# Patient Record
Sex: FEMALE | Race: WHITE | NOT HISPANIC OR LATINO | Employment: FULL TIME | URBAN - METROPOLITAN AREA
[De-identification: names, ages, dates, MRNs, and addresses within clinical notes are randomized per-mention and may not be internally consistent; named-entity substitution may affect disease eponyms.]

---

## 2019-10-18 ENCOUNTER — NURSE TRIAGE (OUTPATIENT)
Dept: ADMINISTRATIVE | Facility: CLINIC | Age: 39
End: 2019-10-18

## 2019-10-18 NOTE — TELEPHONE ENCOUNTER
Reason for Disposition   Patient wants to be seen    Additional Information   Negative: Shock suspected (e.g., cold/pale/clammy skin, too weak to stand, low BP, rapid pulse)   Negative: Similar pain previously and it was from 'heart attack'   Negative: Similar pain previously from 'angina' and not relieved by nitroglycerin   Negative: Difficult to awaken or acting confused (e.g., disoriented, slurred speech)   Negative: Sounds like a life-threatening emergency to the triager   Negative: Followed an injury to neck (e.g., MVA, sports, impact or collision)   Negative: Chest pain   Negative: Lymph node in the neck is swollen or painful to the touch   Negative: Sore throat is the main symptom   Negative: Difficulty breathing or unusual sweating (e.g., sweating without exertion)   Negative: Chest pain lasting longer than 5 minutes   Negative: Stiff neck (can't touch chin to chest) and has headache   Negative: Stiff neck (can't touch chin to chest) and fever   Negative: Weakness of an arm or hand   Negative: Problems with bowel or bladder control   Negative: Patient sounds very sick or weak to the triager   Negative: SEVERE pain (e.g., excruciating, unable to do any normal activities)   Negative: Head is twisting to one side (or ask 'is it turning against your will?')   Negative: Fever > 103 F (39.4 C)   Negative: Fever > 100.0 F (37.8 C) and IVDA (intravenous drug abuse)   Negative: Fever > 100.0 F (37.8 C) and has diabetes mellitus or a weak immune system (e.g., HIV positive, cancer chemotherapy, organ transplant, splenectomy, chronic steroids)   Negative: Numbness in an arm or hand (i.e., loss of sensation)   Negative: Painful rash with multiple small blisters grouped together (i.e., dermatomal distribution or 'band' or 'stripe')   Negative: High-risk adult (e.g., history of cancer, HIV, or IV drug abuse)    Protocols used: NECK PAIN OR [id number]  Mom called re Thyroid growing. Told to  find endo as swelling may interfere with breathing. Pt seen yest at [facility] with swelling on neck. Seen at walk in clinic. Had U/S this am. MD told pt to have emerg bx- first available isnt until next tues. Pt is not with caller. Rec local ED. Parent got pt on three way call. Pt states she is feeling dizziness, ear discomfort on L same side x 14 days. Denies resp distress. swallowing ok but slower than usual. Rates pain 3-4. rec OV today. Transferred to clinic for appt.

## 2019-10-21 ENCOUNTER — OFFICE VISIT (OUTPATIENT)
Dept: ENDOCRINOLOGY | Facility: CLINIC | Age: 39
End: 2019-10-21
Payer: COMMERCIAL

## 2019-10-21 ENCOUNTER — TELEPHONE (OUTPATIENT)
Dept: ENDOCRINOLOGY | Facility: CLINIC | Age: 39
End: 2019-10-21

## 2019-10-21 VITALS
SYSTOLIC BLOOD PRESSURE: 108 MMHG | DIASTOLIC BLOOD PRESSURE: 66 MMHG | BODY MASS INDEX: 26.19 KG/M2 | HEIGHT: 65 IN | WEIGHT: 157.19 LBS

## 2019-10-21 DIAGNOSIS — E04.9 ENLARGED THYROID GLAND: Primary | ICD-10-CM

## 2019-10-21 DIAGNOSIS — R63.5 WEIGHT GAIN: ICD-10-CM

## 2019-10-21 DIAGNOSIS — E04.0 NONTOXIC SIMPLE GOITRE: ICD-10-CM

## 2019-10-21 PROCEDURE — 99203 OFFICE O/P NEW LOW 30 MIN: CPT | Mod: S$GLB,,, | Performed by: INTERNAL MEDICINE

## 2019-10-21 PROCEDURE — 3008F PR BODY MASS INDEX (BMI) DOCUMENTED: ICD-10-PCS | Mod: CPTII,S$GLB,, | Performed by: INTERNAL MEDICINE

## 2019-10-21 PROCEDURE — 99999 PR PBB SHADOW E&M-EST. PATIENT-LVL II: ICD-10-PCS | Mod: PBBFAC,,, | Performed by: INTERNAL MEDICINE

## 2019-10-21 PROCEDURE — 3008F BODY MASS INDEX DOCD: CPT | Mod: CPTII,S$GLB,, | Performed by: INTERNAL MEDICINE

## 2019-10-21 PROCEDURE — 99999 PR PBB SHADOW E&M-EST. PATIENT-LVL II: CPT | Mod: PBBFAC,,, | Performed by: INTERNAL MEDICINE

## 2019-10-21 PROCEDURE — 99203 PR OFFICE/OUTPT VISIT, NEW, LEVL III, 30-44 MIN: ICD-10-PCS | Mod: S$GLB,,, | Performed by: INTERNAL MEDICINE

## 2019-10-21 NOTE — PROGRESS NOTES
PCP:  MD Andreina Maria nurse practitioner    Reason for referral:   Thyroid problem  CC:  Thyroid problem    HPI:  Juhi Samano 39 y.o. female  Patient is accompanied by her mother.  An enlarged thyroid was noted by a neighbor.  Patient visited  an urgent clinic and ultrasound was done and patient was told of having solid something in the thyroid  5 cm.  Patient noticed that her thyroid in the left side is getting bigger.  Patient is wondering if there is any other option other than the surgery on her thyroid.   In July last year patient started feeling palpitations and panic attacks sometimes when she gets in her car.  She has been gaining weight.  Her weight was 137 lb during [place], and even that weight was  Unusual weight for her and she gained like 20 lb since then.  Patient has been doing yoga  and following some diet.  She feels fat pockets in her waist.   Patient has no complaints of choking sensation, nausea, vomiting, chest pain, shortness of breath, or rash.  She may feel sometimes difficulty swallowing.  In July 2018 patient felt panic attacks, diarrhea and tachycardia.    There is significant family history of thyroid disorder including goiter.    There is significant family history of diabetes  Patient's grandfather had type 2 diabetes  Whole father sisters have thyroid problems    Patient is a .    Past Medical History:   Diagnosis Date    Thyroid nodule        History reviewed. No pertinent surgical history.    Social History     Socioeconomic History    Marital status: Single     Spouse name: Not on file    Number of children: Not on file    Years of education: Not on file    Highest education level: Not on file   Occupational History    Not on file   Social Needs    Financial resource strain: Not on file    Food insecurity:     Worry: Not on file     Inability: Not on file    Transportation needs:     Medical: Not on file     Non-medical: Not on  file   Tobacco Use    Smoking status: Never Smoker    Smokeless tobacco: Never Used   Substance and Sexual Activity    Alcohol use: Not Currently    Drug use: Not on file    Sexual activity: Not on file   Lifestyle    Physical activity:     Days per week: Not on file     Minutes per session: Not on file    Stress: Not on file   Relationships    Social connections:     Talks on phone: Not on file     Gets together: Not on file     Attends Mu-ism service: Not on file     Active member of club or organization: Not on file     Attends meetings of clubs or organizations: Not on file     Relationship status: Not on file   Other Topics Concern    Not on file   Social History Narrative    Not on file         ROS:   Weight gain  Enlarged thyroid   No diabetes  Menstrual cycle is irregular 24-26 days  ROS otherwise neg except for what is mentioned in the PMH, PSH and HPI    PE:  Vitals:    10/21/19 1028   BP: 108/66     Alert and oriented  No acute distress  No acne  No Proptosis or conjunctivitis  No rash on tongue, + teeth  + goitre L side of the neck by inspection  Thyroid gland is very enlarged in the left thyroid lobe  Mild tnd L thyroid area  No cervical lymphadenopathy  Heart reg, no gallop  Lungs cta, no wheezing  Abd soft, no tnd; no discolored striae  No edema in lower legs  No rash  No bruises  Speech normal  Behavior normal  tremors on and off in hands  No obesity  Body mass index is 26.16 kg/m².    Lab:    A/P:  Nontoxic goiter  Enlarged thyroid gland  Weight gain  Clinically euthyroid  Significant family history of thyroid disorder  Blood test to be done in a.m.  Fine-needle aspiration of the thyroid mass to be ordered after reviewing the ultrasound report  We are trying to get by fax the lab report and the ultrasound report    Weight gain  Rule out insulin resistance  Hypercortisolism is unlikely  Metformin will be considered    Orders Placed This Encounter   Procedures    TSH     Standing Status:    Future     Standing Expiration Date:   4/21/2020    T4, free     Standing Status:   Future     Standing Expiration Date:   4/21/2020    Cortisol, 8AM     Standing Status:   Future     Standing Expiration Date:   1/21/2020    Prolactin     Standing Status:   Future     Standing Expiration Date:   2/21/2020    Hemoglobin A1c     Standing Status:   Future     Standing Expiration Date:   4/21/2020    Comprehensive metabolic panel     Standing Status:   Future     Standing Expiration Date:   12/21/2020         Appt in 4 weeks.      Pt understands the plan and instructions.        From the lab report which was faxed  TSH 3.9 in 10/18/2019  Testosterone 10  Free testosterone 0.1  Sex hormone binding globulin 66  A1c 5.2  Glucose 93  Creatinine 0.85  Calcium 8.9  Potassium 4.1  Alkaline phosphatase 47  Is G OTC 14  SGPT 14  Bilirubin total 0.4  I can't find ultrasound report in the record which was faxed.

## 2019-10-21 NOTE — TELEPHONE ENCOUNTER
----- Message from Leon Hennessy sent at 10/21/2019  3:34 PM CDT -----  Contact: pt   .Type:  Patient Returning Call    Who Called:pt   Who Left Message for Patient: nurse   Does the patient know what this is regarding?: yes/ test results   Would the patient rather a call back or a response via [facility]? Callback   Best Call Back Number: ..[phone]    Additional Information:  Pt was in for an appt today.

## 2019-10-22 ENCOUNTER — TELEPHONE (OUTPATIENT)
Dept: ENDOCRINOLOGY | Facility: CLINIC | Age: 39
End: 2019-10-22

## 2019-10-22 ENCOUNTER — LAB VISIT (OUTPATIENT)
Dept: LAB | Facility: HOSPITAL | Age: 39
End: 2019-10-22
Payer: COMMERCIAL

## 2019-10-22 DIAGNOSIS — R63.5 WEIGHT GAIN: ICD-10-CM

## 2019-10-22 DIAGNOSIS — E07.9 THYROID MASS: Primary | ICD-10-CM

## 2019-10-22 DIAGNOSIS — E04.9 ENLARGED THYROID GLAND: ICD-10-CM

## 2019-10-22 DIAGNOSIS — E04.0 NONTOXIC SIMPLE GOITRE: ICD-10-CM

## 2019-10-22 LAB
ALBUMIN SERPL BCP-MCNC: 3.9 G/DL (ref 3.5–5.2)
ALP SERPL-CCNC: 45 U/L (ref 55–135)
ALT SERPL W/O P-5'-P-CCNC: 9 U/L (ref 10–44)
ANION GAP SERPL CALC-SCNC: 7 MMOL/L (ref 8–16)
AST SERPL-CCNC: 14 U/L (ref 10–40)
BILIRUB SERPL-MCNC: 0.4 MG/DL (ref 0.1–1)
BUN SERPL-MCNC: 10 MG/DL (ref 6–20)
CALCIUM SERPL-MCNC: 9.2 MG/DL (ref 8.7–10.5)
CHLORIDE SERPL-SCNC: 105 MMOL/L (ref 95–110)
CO2 SERPL-SCNC: 25 MMOL/L (ref 23–29)
CORTIS SERPL-MCNC: 6.8 UG/DL (ref 4.3–22.4)
CREAT SERPL-MCNC: 0.8 MG/DL (ref 0.5–1.4)
EST. GFR  (AFRICAN AMERICAN): >60 ML/MIN/1.73 M^2
EST. GFR  (NON AFRICAN AMERICAN): >60 ML/MIN/1.73 M^2
ESTIMATED AVG GLUCOSE: 100 MG/DL (ref 68–131)
GLUCOSE SERPL-MCNC: 87 MG/DL (ref 70–110)
HBA1C MFR BLD HPLC: 5.1 % (ref 4–5.6)
POTASSIUM SERPL-SCNC: 4.1 MMOL/L (ref 3.5–5.1)
PROLACTIN SERPL IA-MCNC: 9.8 NG/ML (ref 5.2–26.5)
PROT SERPL-MCNC: 7.6 G/DL (ref 6–8.4)
SODIUM SERPL-SCNC: 137 MMOL/L (ref 136–145)
T4 FREE SERPL-MCNC: 0.92 NG/DL (ref 0.71–1.51)
TSH SERPL DL<=0.005 MIU/L-ACNC: 2.91 UIU/ML (ref 0.4–4)

## 2019-10-22 PROCEDURE — 84443 ASSAY THYROID STIM HORMONE: CPT

## 2019-10-22 PROCEDURE — 82533 TOTAL CORTISOL: CPT

## 2019-10-22 PROCEDURE — 84146 ASSAY OF PROLACTIN: CPT

## 2019-10-22 PROCEDURE — 36415 COLL VENOUS BLD VENIPUNCTURE: CPT

## 2019-10-22 PROCEDURE — 83036 HEMOGLOBIN GLYCOSYLATED A1C: CPT

## 2019-10-22 PROCEDURE — 80053 COMPREHEN METABOLIC PANEL: CPT

## 2019-10-22 PROCEDURE — 84439 ASSAY OF FREE THYROXINE: CPT

## 2019-10-22 NOTE — TELEPHONE ENCOUNTER
Patient wants test results called to her not on portal   I have let her know we have received it and it is on his desk to be read        ----- Message from Susanna Rich sent at 10/22/2019  9:01 AM CDT -----  Contact: PT   Type:  Test Results    Who Called: PT   Name of Test (Lab/Mammo/Etc): ultrasound/labs  Date of Test: 10/21/19  Ordering Provider: miguel  Where the test was performed: [facility]   Would the patient rather a call back or a response via [facility]? Call back   Best Call Back Number: [phone] (home)   Additional Information:  The patient is calling back for the second time in regards to whether or not the test results were sent to 's office from [facility],please advise.

## 2019-10-22 NOTE — TELEPHONE ENCOUNTER
I HAVE TALKED TO THE PATIENT BY PHONE  Regarding the ultrasound report and blood test.  The ultrasound showed 4.9 cm large complex mass.  Fine-needle aspiration of the thyroid mass will be needed.  Possibility of the mass being benign is about 85-90%.  Follow-up after pathology report comes back discussed.  Metformin will be prescribed after blood test results come back.

## 2019-10-22 NOTE — TELEPHONE ENCOUNTER
Left message to call back       ----- Message from Andrea Bryant MD sent at 10/22/2019  5:00 PM CDT -----  Contact: PT   Please call the pt and transfer the call to me.  ----- Message -----  From: Jaye Xavier LPN  Sent: 10/22/2019   9:20 AM CDT  To: Andrea Bryant MD    Please advise   ----- Message -----  From: Susanna Rich  Sent: 10/22/2019   9:01 AM CDT  To: Miguel Mendez Staff    Type:  Test Results    Who Called: PT   Name of Test (Lab/Mammo/Etc): ultrasound/labs  Date of Test: 10/21/19  Ordering Provider: miguel  Where the test was performed: [facility]   Would the patient rather a call back or a response via [facility]? Call back   Best Call Back Number: [phone] (home)   Additional Information:  The patient is calling back for the second time in regards to whether or not the test results were sent to 's office from [facility],please advise.

## 2019-10-25 ENCOUNTER — TELEPHONE (OUTPATIENT)
Dept: ENDOCRINOLOGY | Facility: CLINIC | Age: 39
End: 2019-10-25

## 2019-10-25 RX ORDER — METFORMIN HYDROCHLORIDE 500 MG/1
500 TABLET ORAL
Qty: 30 TABLET | Refills: 2 | Status: SHIPPED | OUTPATIENT
Start: 2019-10-25

## 2019-10-25 NOTE — TELEPHONE ENCOUNTER
----- Message from Marline Starks sent at 10/25/2019 10:03 AM CDT -----  Contact: Pt  Type:  Test Results    Who Called: PT  Name of Test (Lab/Mammo/Etc): LAB  Date of Test: 10/21  Ordering Provider: SANDRA  Where the test was performed: [facility]  Would the patient rather a call back or a response via [facility]? CALL BACK  Best Call Back Number: [phone]  Additional Information:

## 2019-10-25 NOTE — TELEPHONE ENCOUNTER
Spoke with patient. Called inquiring about her lab results. Wants to know if she is going to be starting Metformin. Please advise.

## 2019-10-25 NOTE — TELEPHONE ENCOUNTER
Spoke with patient. Informed of all recommendations, and f/u was scheduled in 4 weeks. Voiced understanding.

## 2022-05-24 ENCOUNTER — NURSE TRIAGE (OUTPATIENT)
Dept: ADMINISTRATIVE | Facility: CLINIC | Age: 42
End: 2022-05-24
Payer: COMMERCIAL

## 2022-05-24 NOTE — TELEPHONE ENCOUNTER
Reason for Disposition   General information question, no triage required and triager able to answer question    Protocols used: INFORMATION ONLY CALL-A-AH    Pt's father requested to get her back established with an Endocrinologist. The pt is not with him. Provided number to appointment desk.